# Patient Record
Sex: FEMALE | Race: WHITE | Employment: UNEMPLOYED | URBAN - METROPOLITAN AREA
[De-identification: names, ages, dates, MRNs, and addresses within clinical notes are randomized per-mention and may not be internally consistent; named-entity substitution may affect disease eponyms.]

---

## 2017-03-06 ENCOUNTER — HOSPITAL ENCOUNTER (OUTPATIENT)
Dept: PHYSICAL THERAPY | Age: 52
Discharge: HOME | End: 2017-03-06
Payer: MEDICARE

## 2017-03-06 PROCEDURE — G8981 BODY POS CURRENT STATUS: HCPCS

## 2017-03-06 PROCEDURE — G8982 BODY POS GOAL STATUS: HCPCS

## 2017-03-06 PROCEDURE — 97162 PT EVAL MOD COMPLEX 30 MIN: CPT

## 2017-03-06 PROCEDURE — 97110 THERAPEUTIC EXERCISES: CPT
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
## 2017-03-06 NOTE — PROGRESS NOTES
PHYSICAL THERAPY - DAILY TREATMENT NOTE    Patient Name: Feliberto Hernandez        Date: 3/6/2017  : 1965   YES Patient  Verified  Visit #:     Insurance: Payor: Елена Wu / Plan: [place] MED[place] & B / Product Type: Medicare /      In time: 1000 Out time: 1025   Total Treatment Time: 25     Medicare Time Tracking (below)   Total Timed Codes (min):  8 1:1 Treatment Time:  8     TREATMENT AREA =  Radiculopathy, cervical region [M54.12]  Low back pain [M54.5]    SUBJECTIVE  Pain Level (on 0 to 10 scale):  7  / 10   Medication Changes/New allergies or changes in medical history, any new surgeries or procedures? NO    If yes, update Summary List   Subjective Functional Status/Changes:  []  No changes reported     See POC          OBJECTIVE      8 min Therapeutic Exercise:  [x]  See flow sheet   Rationale:      increase ROM and increase strength to improve the patients ability to perform self care activities. min Patient Education:  YES  Reviewed HEP   []  Progressed/Changed HEP based on: Other Objective/Functional Measures:    See POC     Post Treatment Pain Level (on 0 to 10) scale:   7  / 10     ASSESSMENT  Assessment/Changes in Function:     See POC     []  See Progress Note/Recertification   Patient will continue to benefit from skilled PT services to modify and progress therapeutic interventions, address functional mobility deficits, address ROM deficits, address strength deficits, analyze and address soft tissue restrictions, analyze and cue movement patterns, analyze and modify body mechanics/ergonomics and assess and modify postural abnormalities to attain remaining goals.    Progress toward goals / Updated goals:    See POC     PLAN  [x]  Upgrade activities as tolerated YES Continue plan of care   []  Discharge due to :    []  Other:      Therapist: Tata Burgos PT    Date: 3/6/2017 Time: 10:47 AM     Future Appointments  Date Time Provider Shandra Vasquez 3/17/2017 1:30 PM Kwame Singh, PT [facility] [address]   3/21/2017 9:30 AM Otto Caballero, PT [place] [address]   3/24/2017 9:30 AM Kwame Singh, PT [facility] [place] [address]   3/28/2017 11:30 AM Otto Caballero, PT [facility] [place] [address]   3/31/2017 10:00 AM Otto Caballero, PT [facility] [place] [address]   4/3/2017 10:00 AM Kwame Singh, PT [facility] [place] [address]   4/7/2017 10:00 AM Kwame Singh, PT [facility] [place] [address]

## 2017-03-17 ENCOUNTER — HOSPITAL ENCOUNTER (OUTPATIENT)
Dept: PHYSICAL THERAPY | Age: 52
End: 2017-03-17
Payer: MEDICARE

## 2017-03-21 ENCOUNTER — APPOINTMENT (OUTPATIENT)
Dept: PHYSICAL THERAPY | Age: 52
End: 2017-03-21
Payer: MEDICARE

## 2017-03-23 ENCOUNTER — HOSPITAL ENCOUNTER (OUTPATIENT)
Dept: PHYSICAL THERAPY | Age: 52
Discharge: HOME | End: 2017-03-23
Payer: MEDICARE

## 2017-03-23 PROCEDURE — 97110 THERAPEUTIC EXERCISES: CPT

## 2017-03-23 PROCEDURE — 97140 MANUAL THERAPY 1/> REGIONS: CPT

## 2017-03-23 NOTE — PROGRESS NOTES
PHYSICAL THERAPY - DAILY TREATMENT NOTE    Patient Name: Lenka Sandhu        Date: 3/23/2017  : 1965   YES Patient  Verified  Visit #:   2   of   12  Insurance: Payor: Charo Fang / Plan: [place] MEDICARE PART A & B / Product Type: Medicare /      In time: 855 Out time: 937   Total Treatment Time: 42     Medicare Time Tracking (below)   Total Timed Codes (min):  42 1:1 Treatment Time:  32     TREATMENT AREA =  Low back pain [M54.5]  Neck pain [M54.2]    SUBJECTIVE  Pain Level (on 0 to 10 scale):  7  / 10   Medication Changes/New allergies or changes in medical history, any new surgeries or procedures? NO    If yes, update Summary List   Subjective Functional Status/Changes:  []  No changes reported     Patient reports she hasn't been able to attend PT since her IE because of having bronchitis and then a sinus infection. Patient does report compliance with her HEP and denies questions or complications. OBJECTIVE      32 min Therapeutic Exercise:  [x]  See flow sheet   Rationale:      increase ROM and increase strength to improve the patients ability to perform household activities. 10 min Manual Therapy: STM to B cervical paraspinals, B UT, B SOR; cervical spine joint mobs   Rationale:      decrease pain, increase ROM, increase tissue extensibility and decrease trigger points to improve patient's ability to perform self care activities. min Patient Education:  YES  Reviewed HEP   []  Progressed/Changed HEP based on: Other Objective/Functional Measures:    1:1 TE 25'  Patient presenting with significant tenderness to B cervical paraspinal and UT musculature during MT, limiting effectiveness. Progressed TE program for improved cervical spine ROM and scapular strength and stability. Post Treatment Pain Level (on 0 to 10) scale:   5  / 10     ASSESSMENT  Assessment/Changes in Function:     Patient reported improved symptoms post session.  Educated to utilize heat or ice when at home in order to reduce any resulting soreness from today's session. []  See Progress Note/Recertification   Patient will continue to benefit from skilled PT services to modify and progress therapeutic interventions, address functional mobility deficits, address ROM deficits, address strength deficits, analyze and address soft tissue restrictions, analyze and cue movement patterns, analyze and modify body mechanics/ergonomics and assess and modify postural abnormalities to attain remaining goals.    Progress toward goals / Updated goals:    Progressing with STG#1     PLAN  [x]  Upgrade activities as tolerated YES Continue plan of care   []  Discharge due to :    []  Other:      Therapist: Dara Pruitt, PT    Date: 3/23/2017 Time: 10:55 AM     Future Appointments  Date Time Provider Shandra Vasquez   3/24/2017 9:30 AM Evelyn Rios PT [facility]   3/28/2017 11:30 AM Dara Pruitt, PT [facility]   3/31/2017 10:00 AM Dara Pruitt PT [facility]   4/3/2017 10:00 AM Evelyn Rios PT [facility]   4/7/2017 10:00 AM Evelyn Rios, PT [facility]

## 2017-03-24 ENCOUNTER — HOSPITAL ENCOUNTER (OUTPATIENT)
Dept: PHYSICAL THERAPY | Age: 52
Discharge: HOME | End: 2017-03-24
Payer: MEDICARE

## 2017-03-24 PROCEDURE — 97140 MANUAL THERAPY 1/> REGIONS: CPT

## 2017-03-24 NOTE — PROGRESS NOTES
PHYSICAL THERAPY - DAILY TREATMENT NOTE    Patient Name: Chad Young        Date: 3/24/2017  : 1965   YES Patient  Verified  Visit #:   3   of   12  Insurance: Payor: Abiodun Perish / Plan: [place] MEDICARE PART A & B / Product Type: Medicare /      In time: 9:20 Out time: 10:05   Total Treatment Time: 45     Medicare Time Tracking (below)   Total Timed Codes (min):  45 1:1 Treatment Time:  10     TREATMENT AREA =  Low back pain [M54.5]  Neck pain [M54.2]    SUBJECTIVE  Pain Level (on 0 to 10 scale):  6   10   Medication Changes/New allergies or changes in medical history, any new surgeries or procedures? NO    If yes, update Summary List   Subjective Functional Status/Changes:  []  No changes reported     No new c/o          OBJECTIVE      35 min Therapeutic Exercise:  [x]  See flow sheet   Rationale:      increase ROM, increase strength and improve coordination to improve the patients ability to perform ADLs     10 min Manual Therapy: STM c/s para, SCM, UT, SOR   Rationale:      decrease pain, increase ROM and increase tissue extensibility to improve patient's ability to perform ADLs       min Patient Education:  YES  Reviewed HEP   []  Progressed/Changed HEP based on: Other Objective/Functional Measures:    1:1 9:55 - 10:05  Man Rx  C/o increaes in pain with chin tucks     Post Treatment Pain Level (on 0 to 10) scale:   5  / 10     ASSESSMENT  Assessment/Changes in Function:     Good swathi to all other ex without increase in pain      []  See Progress Note/Recertification   Patient will continue to benefit from skilled PT services to modify and progress therapeutic interventions, address functional mobility deficits, address ROM deficits, address strength deficits, analyze and address soft tissue restrictions, analyze and cue movement patterns, analyze and modify body mechanics/ergonomics and assess and modify postural abnormalities to attain remaining goals.    Progress toward goals / Updated goals:     No change towards LTGs     PLAN  []  Upgrade activities as tolerated YES Continue plan of care   []  Discharge due to :    []  Other:      Therapist: Chriss Fernandez, PT, OCS, SCS, CSCS    Date: 3/24/2017 Time: 10:04 AM       Future Appointments  Date Time Provider Shandra Vasquez   3/28/2017 11:30 AM [address] [facility]   3/31/2017 10:00 AM Max Angela, PT [facility]   4/3/2017 10:00 AM Lana Estrada PT [facility]   4/7/2017 10:00 AM Lana Estrada PT [facility]

## 2017-03-28 ENCOUNTER — APPOINTMENT (OUTPATIENT)
Dept: PHYSICAL THERAPY | Age: 52
End: 2017-03-28
Payer: MEDICARE

## 2017-03-31 ENCOUNTER — HOSPITAL ENCOUNTER (OUTPATIENT)
Dept: PHYSICAL THERAPY | Age: 52
Discharge: HOME | End: 2017-03-31
Payer: MEDICARE

## 2017-03-31 PROCEDURE — 97140 MANUAL THERAPY 1/> REGIONS: CPT

## 2017-03-31 PROCEDURE — G8982 BODY POS GOAL STATUS: HCPCS

## 2017-03-31 PROCEDURE — G8983 BODY POS D/C STATUS: HCPCS

## 2017-03-31 PROCEDURE — 97110 THERAPEUTIC EXERCISES: CPT

## 2017-03-31 NOTE — PROGRESS NOTES
PHYSICAL THERAPY - DAILY TREATMENT NOTE    Patient Name: Matti Tony        Date: 3/31/2017  : 1965   YES Patient  Verified  Visit #:     Insurance: Payor: Mirta Yeny / Plan: [place] MEDICARE PART A & B / Product Type: Medicare /      In time: 122 Out time: 1030   Total Treatment Time: 33     Medicare Time Tracking (below)   Total Timed Codes (min):  33 1:1 Treatment Time:  23     TREATMENT AREA =  Low back pain [M54.5]  Neck pain [M54.2]    SUBJECTIVE  Pain Level (on 0 to 10 scale):  0  / 10   Medication Changes/New allergies or changes in medical history, any new surgeries or procedures? NO    If yes, update Summary List   Subjective Functional Status/Changes:  []  No changes reported     Patient reports her neck is feeling better and she has been exercising it regularly on her own. Patient states she would like to be evaluated for her lower back soon as well. OBJECTIVE      23 min Therapeutic Exercise:  [x]  See flow sheet   Rationale:      increase ROM and increase strength to improve the patients ability to perform household activities. 10 min Manual Therapy: STM to L cervical paraspinals, L scalenes, L UT/LS; Cervical spine joint mobs, SOR, L UT stretch   Rationale:      decrease pain, increase ROM, increase tissue extensibility and decrease trigger points to improve patient's ability to perform carrying activities. min Patient Education:  YES  Reviewed HEP   []  Progressed/Changed HEP based on: Other Objective/Functional Measures:    1:1 TE 13'  Improving soft tissue extensibility noted to L cervical spine musculature compared to previous sessions  Demonstrates god knowledge of exercise program requiring minimal cuing for form/technique. Post Treatment Pain Level (on 0 to 10) scale:   5  / 10     ASSESSMENT  Assessment/Changes in Function:     Patient reported increased pain post session which she related to exercise and manual therapy.  Patient educated to continue with her HEP at this time. []  See Progress Note/Recertification   Patient will continue to benefit from skilled PT services to modify and progress therapeutic interventions, address functional mobility deficits, address ROM deficits, address strength deficits, analyze and address soft tissue restrictions, analyze and cue movement patterns, analyze and modify body mechanics/ergonomics and assess and modify postural abnormalities to attain remaining goals.    Progress toward goals / Updated goals:    Met STG#1 and progressing with STG#2     PLAN  [x]  Upgrade activities as tolerated YES Continue plan of care   []  Discharge due to :    []  Other:      Therapist: Maribeth Ceballos PT    Date: 3/31/2017 Time: 10:38 AM     Future Appointments  Date Time Provider Shandra Vasquez   4/3/2017 10:00 AM Jacque Thomas PT [facility]   4/7/2017 10:00 AM Jacque Thomas, PT [facility]   4/10/2017 11:00 AM Maribeth Ceballos PT [facility]

## 2017-04-03 ENCOUNTER — APPOINTMENT (OUTPATIENT)
Dept: PHYSICAL THERAPY | Age: 52
End: 2017-04-03
Payer: MEDICARE

## 2017-04-07 ENCOUNTER — HOSPITAL ENCOUNTER (OUTPATIENT)
Dept: PHYSICAL THERAPY | Age: 52
Discharge: HOME | End: 2017-04-07
Payer: MEDICARE

## 2017-04-10 ENCOUNTER — APPOINTMENT (OUTPATIENT)
Dept: PHYSICAL THERAPY | Age: 52
End: 2017-04-10
Payer: MEDICARE

## 2017-04-10 ENCOUNTER — HOSPITAL ENCOUNTER (OUTPATIENT)
Dept: PHYSICAL THERAPY | Age: 52
Discharge: HOME | End: 2017-04-10
Payer: MEDICARE

## 2017-04-10 PROCEDURE — 97110 THERAPEUTIC EXERCISES: CPT

## 2017-04-10 PROCEDURE — G8979 MOBILITY GOAL STATUS: HCPCS

## 2017-04-10 PROCEDURE — G8978 MOBILITY CURRENT STATUS: HCPCS

## 2017-04-10 PROCEDURE — G8980 MOBILITY D/C STATUS: HCPCS

## 2017-04-10 PROCEDURE — 97162 PT EVAL MOD COMPLEX 30 MIN: CPT

## 2017-04-10 NOTE — PROGRESS NOTES
PHYSICAL THERAPY - DAILY TREATMENT NOTE    Patient Name: Lexis Pickard        Date: 4/10/2017  : 1965   YES Patient  Verified  Visit #:     Insurance: Payor: Ileana Salazar / Plan: [place] MEDICARE PART A & B / Product Type: Medicare /      In time: 1000 Out time: 1030   Total Treatment Time: 30     Medicare Time Tracking (below)   Total Timed Codes (min):  10 1:1 Treatment Time:  10     TREATMENT AREA =  Low back pain [M54.5]    SUBJECTIVE  Pain Level (on 0 to 10 scale):  8  / 10   Medication Changes/New allergies or changes in medical history, any new surgeries or procedures? NO    If yes, update Summary List   Subjective Functional Status/Changes:  []  No changes reported     See POC          OBJECTIVE      10 min Therapeutic Exercise:  [x]  See flow sheet   Rationale:      increase ROM, increase strength, improve coordination and improve balance to improve the patients ability to perform walking activities. min Patient Education:  YES  Reviewed HEP   []  Progressed/Changed HEP based on: Other Objective/Functional Measures:    See POC     Post Treatment Pain Level (on 0 to 10) scale:    10     ASSESSMENT  Assessment/Changes in Function:     See POC     []  See Progress Note/Recertification   Patient will continue to benefit from skilled PT services to modify and progress therapeutic interventions, address functional mobility deficits, address ROM deficits, address strength deficits, analyze and address soft tissue restrictions, analyze and cue movement patterns, analyze and modify body mechanics/ergonomics, assess and modify postural abnormalities and address imbalance/dizziness to attain remaining goals.    Progress toward goals / Updated goals:    See POC     PLAN  [x]  Upgrade activities as tolerated YES Continue plan of care   []  Discharge due to :    []  Other:      Therapist: Roby Aburto, PT    Date: 4/10/2017 Time: 10:48 AM     Future Appointments  Date Time Provider Shandra Vasquez   4/17/2017 2:30 PM Erlin Collins, PT [facility]   4/21/2017 10:30 AM Kory Broderick, PT [facility]   4/24/2017 9:30 AM Kory Broderick, PT [facility]   4/28/2017 10:30 AM Erlin Collins, PT [facility]
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
## 2017-04-17 ENCOUNTER — APPOINTMENT (OUTPATIENT)
Dept: PHYSICAL THERAPY | Age: 52
End: 2017-04-17
Payer: MEDICARE

## 2017-04-21 ENCOUNTER — HOSPITAL ENCOUNTER (OUTPATIENT)
Dept: PHYSICAL THERAPY | Age: 52
Discharge: HOME | End: 2017-04-21
Payer: MEDICARE

## 2017-04-21 PROCEDURE — 97110 THERAPEUTIC EXERCISES: CPT

## 2017-04-21 PROCEDURE — 97140 MANUAL THERAPY 1/> REGIONS: CPT

## 2017-04-21 NOTE — PROGRESS NOTES
PHYSICAL THERAPY - DAILY TREATMENT NOTE    Patient Name: Claudean Myron        Date: 2017  : 1965   YES Patient  Verified  Visit #:     Insurance: Payor: Syeda Valladares / Plan: [place] MED[place] & B / Product Type: Medicare /      In time: 10:30 Out time: 11:10   Total Treatment Time: 40     Medicare Time Tracking (below)   Total Timed Codes (min):  40 1:1 Treatment Time:  40     TREATMENT AREA =  Low back pain [M54.5]    SUBJECTIVE  Pain Level (on 0 to 10 scale):  6  / 10   Medication Changes/New allergies or changes in medical history, any new surgeries or procedures? NO    If yes, update Summary List   Subjective Functional Status/Changes:  []  No changes reported     Felt better after initial eval.  Now I feel at my low back and pain down to my L leg          OBJECTIVE      30 min Therapeutic Exercise:  [x]  See flow sheet   Rationale:      increase ROM, increase strength and improve coordination to improve the patients ability to perform DALs     10 min Manual Therapy: DTM  Para, GM, piri, L5 L ERS, L on L SI jose   Rationale:      decrease pain, increase ROM and increase tissue extensibility to improve patient's ability to perform ADLs       min Patient Education:  YES  Reviewed HEP   []  Progressed/Changed HEP based on: Other Objective/Functional Measures:    Held some ex secondary to pain      Post Treatment Pain Level (on 0 to 10) scale:   6  / 10     ASSESSMENT  Assessment/Changes in Function:   Poor swathi to most ex     []  See Progress Note/Recertification   Patient will continue to benefit from skilled PT services to modify and progress therapeutic interventions, address functional mobility deficits, address ROM deficits, address strength deficits, analyze and address soft tissue restrictions, analyze and cue movement patterns, analyze and modify body mechanics/ergonomics and assess and modify postural abnormalities to attain remaining goals.    Progress toward goals / Updated goals:     Independent with all Ex     PLAN  []  Upgrade activities as tolerated YES Continue plan of care   []  Discharge due to :    []  Other:      Therapist: Radha Ramirez, PT, OCS, SCS, CSCS    Date: 4/21/2017 Time: 10:35 AM       Future Appointments  Date Time Provider Shandra Vasquez   4/24/2017 9:30 AM Jalyn Stevenson, PT [facility]   4/28/2017 10:30 AM Elizabeth Tirado, PT [facility]

## 2017-04-24 ENCOUNTER — HOSPITAL ENCOUNTER (OUTPATIENT)
Dept: PHYSICAL THERAPY | Age: 52
Discharge: HOME | End: 2017-04-24
Payer: MEDICARE

## 2017-04-24 PROCEDURE — G8979 MOBILITY GOAL STATUS: HCPCS

## 2017-04-24 PROCEDURE — 97140 MANUAL THERAPY 1/> REGIONS: CPT

## 2017-04-24 PROCEDURE — 97110 THERAPEUTIC EXERCISES: CPT

## 2017-04-24 PROCEDURE — G8980 MOBILITY D/C STATUS: HCPCS

## 2017-04-24 NOTE — PROGRESS NOTES
PHYSICAL THERAPY - DAILY TREATMENT NOTE    Patient Name: Barbara Madrigal        Date: 2017  : 1965   YES Patient  Verified  Visit #:   3   of   12  Insurance: Payor: Bassem Jain / Plan: [place] MEDICARE PART A & B / Product Type: Medicare /      In time: 9:20 Out time: 10:05   Total Treatment Time: 45     Medicare Time Tracking (below)   Total Timed Codes (min):  45 1:1 Treatment Time:  40     TREATMENT AREA =  Low back pain [M54.5]    SUBJECTIVE  Pain Level (on 0 to 10 scale):  6  / 10   Medication Changes/New allergies or changes in medical history, any new surgeries or procedures? NO    If yes, update Summary List   Subjective Functional Status/Changes:  []  No changes reported     No new c/o          OBJECTIVE  30 min Therapeutic Exercise: [x] See flow sheet   Rationale: increase ROM, increase strength and improve coordination to improve the patients ability to perform DALs      15 min Manual Therapy: DTM Para, GM, piri, L5 L ERS, L on L SI jose   Rationale: decrease pain, increase ROM and increase tissue extensibility to improve patient's ability to perform ADLs     min Patient Education:  YES  Reviewed HEP   []  Progressed/Changed HEP based on: Other Objective/Functional Measures:    1:1 9:20 - 9:35  Man Rx  1:1 9:35 - 10:00  therex  Difficulty activating TrA noted     Post Treatment Pain Level (on 0 to 10) scale:    10     ASSESSMENT  Assessment/Changes in Function:     Good swathi to all Rx without increase in pain      []  See Progress Note/Recertification   Patient will continue to benefit from skilled PT services to modify and progress therapeutic interventions, address functional mobility deficits, address ROM deficits, address strength deficits, analyze and address soft tissue restrictions, analyze and cue movement patterns, analyze and modify body mechanics/ergonomics and assess and modify postural abnormalities to attain remaining goals.    Progress toward goals / Updated goals:     No change towards LTGs today     PLAN  []  Upgrade activities as tolerated YES Continue plan of care   []  Discharge due to :    []  Other:      Therapist: Lucy Jones, PT, OCS, SCS, CSCS    Date: 4/24/2017 Time: 9:23 AM       Future Appointments  Date Time Provider Shandra Vasquez   4/24/2017 9:30 AM Bj Singletary PT [facility]   4/28/2017 10:30 AM Prakash Prescott PT [facility]   5/5/2017 9:30 AM Bj Singletary PT [facility]   5/8/2017 11:00 AM Prakash [place] PT [facility]

## 2017-04-28 ENCOUNTER — APPOINTMENT (OUTPATIENT)
Dept: PHYSICAL THERAPY | Age: 52
End: 2017-04-28
Payer: MEDICARE

## 2017-05-05 ENCOUNTER — APPOINTMENT (OUTPATIENT)
Dept: PHYSICAL THERAPY | Age: 52
End: 2017-05-05

## 2017-05-08 ENCOUNTER — APPOINTMENT (OUTPATIENT)
Dept: PHYSICAL THERAPY | Age: 52
End: 2017-05-08

## 2017-05-15 ENCOUNTER — APPOINTMENT (OUTPATIENT)
Dept: PHYSICAL THERAPY | Age: 52
End: 2017-05-15

## 2017-05-22 NOTE — PROGRESS NOTES
[address] PHYSICAL THERAPY  [address] [place] [address], [place] [address],[facility], 70 [facility] - Phone: [phone]  Fax: [phone] FOR PHYSICAL THERAPY          Patient Name: Rebeca Dimas : 1965   Treatment/Medical Diagnosis: Low back pain [M54.5]  Neck pain [M54.2]   Onset Date: 17    Referral Source: Amy Lopez MD [facility]): 3/6/17   Prior Hospitalization: See Medical History Provider #: [id number]   Prior Level of Function: No difficulty with driving, self care, sitting, walking or standing activities   Comorbidities: Fibromyalgia, Depression, Arthritis, Latex allergy, Tobacco use   Medications: Verified on Patient Summary List   Visits from [facility]: 4 Missed Visits: 3     Goal/Measure of Progress Goal Met? 1. Patient will improve FOTO to >/= 65/100 in order to improve quality of life   Status at last Eval: 47/100 Current Status: 46/100 no   2. Patient will report reduction in pain at worst to 2-3/10 in order to improve tolerance to self care activities. Status at last Eval: 10/10 Current Status: 0/10 yes   3. Patient will improve cervical spine AROM to WNL and pain free for all planes in order to improve tolerance to household activities. Status at last Eval: Cervical spine AROM: flex 18 (P!), ext 5 (P!), R rot 30 (P!), L rot 10 (P!), R SB 5 (P!), l SB 5 (P!) Current Status: Cervical Spine AROM WNL and pain free yes     Key Functional Changes/Progress: Patient arrived to clinic on 17 stating her neck felt good and was not experiencing any more pain. Patient demonstrated full cervical spine AROM without discomfort. Patient educated to continue with current therapy program independently. While patient reported a reduction in her FOTO score, she did report a +5 on the global rate of change scale indicating she was a good deal better. At this time, patient will be progressed toward DC to HEP.  Thank you for this referral. G-Codes (GP): [id number] Goal  CJ= 20-39%  [id number] D/C  CK= 40-59%. The severity rating is based on the FOTO Score  Assessments/Recommendations: Discontinue therapy. Progressing towards or have reached established goals. If you have any questions/comments please contact us directly at [phone]. Thank you for allowing us to assist in the care of your patient. Therapist Signature:  Bernetta Gowers, PT Date: 5/22/17   Reporting Period: 3/6/17-5/22/17 Time: 1:43 PM

## 2017-06-14 NOTE — PROGRESS NOTES
[address] PHYSICAL THERAPY  [address] [place] [address] 51, [facility] [address],[place], 70 [facility] - Phone: [phone]  Fax: [phone] FOR PHYSICAL THERAPY          Patient Name: Claude Bustard : 1965   Treatment/Medical Diagnosis: Low back pain [M54.5]   Onset Date: 17    Referral Source: Wallace Omalley MD [facility] [place]): 4/10/17   Prior Hospitalization: See Medical History Provider #: [id number]   Prior Level of Function: No difficulty with bending, lifting, walking or standing activities   Comorbidities: Cervical Spine Pain, Fibromyalgia, Depression, Arthritis, Latex allergy, Tobacco use   Medications: Verified on Patient Summary List   Visits from [facility]: 3 Missed Visits: 3     Goal/Measure of Progress Goal Met? 1. Decrease max pain to 2-3/10 to assist with walking tolerance   Status at last Eval: 8/10 Current Status: na n/a   2. Increase FOTO score to 65 % show functional improvement   Status at last Eval: 46 Current Status: na n/a   3. Will rate >/= +5 on Global Rating of Change and be prepared to DC to HEP. Status at last Eval: na Current Status: na n/a     Key Functional Changes/Progress: Pt had good tolerance to PT treatment. Pt reports she is looking into having a lumbar surgery and self discharged. G-Codes (GP): Mobility  [id number] Goal  CJ= 20-39%  D/C  CK= 40-59%. The severity rating is based on the FOTO Score  Assessments/Recommendations: Discontinue therapy due to lack of attendance or compliance. If you have any questions/comments please contact us directly at [phone]. Thank you for allowing us to assist in the care of your patient.     Therapist Signature: Edvin Simpson, PT, OCS, SCS, CSCS Date: 2017   Reporting Period: 4/10/17 - 17 Time: 6:48 AM

## 2018-06-08 ENCOUNTER — HOSPITAL ENCOUNTER (OUTPATIENT)
Dept: PHYSICAL THERAPY | Age: 53
Discharge: HOME | End: 2018-06-08
Payer: MEDICARE

## 2018-06-08 PROCEDURE — G8978 MOBILITY CURRENT STATUS: HCPCS

## 2018-06-08 PROCEDURE — 97110 THERAPEUTIC EXERCISES: CPT

## 2018-06-08 PROCEDURE — 97161 PT EVAL LOW COMPLEX 20 MIN: CPT

## 2018-06-08 PROCEDURE — G8979 MOBILITY GOAL STATUS: HCPCS

## 2018-06-08 NOTE — PROGRESS NOTES
PHYSICAL THERAPY - DAILY TREATMENT NOTE    Patient Name: Jeniffer Denise        Date: 2018  : 1965   yes Patient  Verified  Visit #:     Insurance: Payor: Jv Staff / Plan: [place] MEDICARE PART A & B / Product Type: Medicare /      In time: 9:00A Out time: 10:00A   Total Treatment Time: 61     Medicare/ BCBS Time Tracking (below)   Total Timed Codes (min):  60 1:1 Treatment Time:  60     TREATMENT AREA =  Right foot pain [M79.671]    SUBJECTIVE  Pain Level (on 0 to 10 scale):  0  / 10   Medication Changes/New allergies or changes in medical history, any new surgeries or procedures?    no  If yes, update Summary List   Subjective Functional Status/Changes:  []  No changes reported     See POC          OBJECTIVE  Modalities Rationale:    PD   min [] Estim, type/location:                                      []  att     []  unatt     []  w/US     []  w/ice    []  w/heat    min []  Mechanical Traction: type/lbs                   []  pro   []  sup   []  int   []  cont    []  before manual    []  after manual    min []  Ultrasound, settings/location:      min []  Iontophoresis w/ dexamethasone, location:                                               []  take home patch       []  in clinic    min []  Ice     []  Heat    location/position:     min []  Vasopneumatic Device, press/temp:     min []  Other:    [] Skin assessment post-treatment (if applicable):    []  intact    []  redness- no adverse reaction     []redness  adverse reaction:        10 min Therapeutic Exercise:  [x]  See flow sheet   Rationale:      increase ROM, increase strength, improve coordination, improve balance and increase proprioception to improve the patients ability to perform unlimited ADLs        min Patient Education:  yes  Reviewed HEP   []  Progressed/Changed HEP based on:        Other Objective/Functional Measures:    See POC     Post Treatment Pain Level (on 0 to 10) scale:   0  / 10 ASSESSMENT  Assessment/Changes in Function:     Patient reports improved ability to ambulate after gentle stretching exercises. []  See Progress Note/Recertification   Patient will continue to benefit from skilled PT services to modify and progress therapeutic interventions, address functional mobility deficits, address ROM deficits, address strength deficits, analyze and address soft tissue restrictions, analyze and cue movement patterns, analyze and modify body mechanics/ergonomics, assess and modify postural abnormalities, address imbalance/dizziness and instruct in home and community integration to attain remaining goals. Progress toward goals / Updated goals:    Progressing towards LTG 2. PLAN  [x]  Upgrade activities as tolerated yes Continue plan of care   []  Discharge due to :    []  Other:      Therapist: Ector Mendiola PT, DPT    Date: 6/8/2018 Time: 12:47 PM     No future appointments.

## 2018-06-08 NOTE — PROGRESS NOTES
[address] [facility] AT [address] [place] [address] [facility], [address] [place] [address], [address]  Phone: [phone]  Fax: [phone] / [address] [address] PHYSICAL THERAPY SERVICES  Patient Name: Diomedes Montoya : 1965   Medical   Diagnosis: Right foot pain [M79.671] Treatment Diagnosis: R foot pain   Onset Date: 18     Referral Source: Gavin Pascual DPM Start of Care [facility]): 2018   Prior Hospitalization: See medical history Provider #: [id number]   Prior Level of Function: Ambulating > 16,000 steps without pain   Comorbidities: H/o LBP, c/s pain, L knee pain, chronic bronchitis, arthritis    Medications: Verified on Patient Summary List   The Plan of Care and following information is based on the information from the initial evaluation.   ==================================================================================  Assessment / key information:  Pt is a 47 y.o. female who presents to [facility] with Dx of s/p bunionectomy and R bursitis. Patient reports initial onset of sx on 18 when she reports having a mass shaved form the plantar surface of the R foot as well as a bunionectomy performed. Patient reports sx are intermittent in nature. Walking, walking on incline/sand, and ambulating in tennis shoes increase sx, rest,, not walking and use of celebrex decrease symptoms. Average reported pain level at 2/10, 5/10 at worst & 0/10 at best.  She currently ambulates in hard sole sandals and is unable to wear sneakers for > 4 hours due to increases in overall pain/swelling. Objective evaluation findings are as follows: 1) patient ambulates with poor toe off on the RLE, dec L step length 2) poor scar mobility noted on the dorsal surface of R great toe 3) dec gastroc-soleus mm length on the R 4) strength of the R ankle; post tib 3+/5, eversion 4-/5, toe flexion 3+/5, DF and PF 4/5.   L ankle is 4/5 throughout. 5)  Great toe dorsiflexion on the L: 0-55 passively, R: 0-25 degrees with pain, ist met blocked L: 0-20 degrees, R: 0-5 degrees. A home exercise program was given to address above deficits. Patient can benefit from PT interventions to improve strength, range of motion, balance, proprioception, coordination, decrease pain, to facilitate ADLs & overall functional status.   ==================================================================================  Eval Complexity: History HIGH Complexity :3+ comorbidities / personal factors will impact the outcome/ POC ;  Examination  MEDIUM Complexity : 3 Standardized tests and measures addressing body structure, function, activity limitation and / or participation in recreation ; Presentation LOW Complexity : Stable, uncomplicated ;  Decision Making MEDIUM Complexity : FOTO score of 26-74; Overall Complexity LOW   Problem List: pain affecting function, decrease ROM, decrease strength, edema affecting function, impaired gait/ balance, decrease ADL/ functional abilitiies, decrease activity tolerance, decrease flexibility/ joint mobility and decrease transfer abilities   Treatment Plan may include any combination of the following: Therapeutic exercise, Therapeutic activities, Neuromuscular re-education, Physical agent/modality, Gait/balance training, Manual therapy, Aquatic therapy, Patient education, Self Care training, Functional mobility training, Home safety training and Stair training  Patient / Family readiness to learn indicated by: asking questions, trying to perform skills and interest  Persons(s) to be included in education: patient (P)  Barriers to Learning/Limitations: None  Measures taken:    Patient Goal (s): \"no pain movement\"   Patient self reported health status: fair  Rehabilitation Potential: good   Short Term Goals: To be accomplished in  2  weeks:  1) Establish HEP to prevent further disability.     2) Patient will report decreased c/o pain to < or = 3/10 to facilitate ambulation with manageable sx in R foot. 3) Patient will increase R great toe dorsiflexion by 5 degrees to facilitate ambulation on uneven surfaces. 4) Pt will increase FOTO score to >/= 58 in order to allow for ease with ADLs.  Long Term Goals: To be accomplished in  4  weeks:  1) Pt to be I and compliant with a progressive high level HEP in order to maintain gains made in physical therapy. 2) Pt will maintain SLS on compliant surface for >/= 10s with good dynamic stability in order to ambulate on uneven surfaces. 3) Patient will increase overall R ankle strength to 4/5 in order to perform unlimited ambulation. 4) Patient will improve FOTO score to >/= 63 in order to allow for ease with ADLs. Frequency / Duration:   Patient to be seen  2  times per week for 4  weeks:  Patient / Caregiver education and instruction: self care, activity modification, brace/ splint application and exercises  G-Codes (GP): Mobility [id number] Current  CK= 40-59%   Goal  CJ= 20-39%. The severity rating is based on the FOTO Score    Therapist Signature: Rayshawn Rain PT, DPT Date: 1/7/5011   Certification Period: 6/8/18- 9/8/18 Time: 9:09 AM   ===========================================================================================  I certify that the above Physical Therapy Services are being furnished while the patient is under my care. I agree with the treatment plan and certify that this therapy is necessary. Physician Signature:        Date:       Time:     Please sign and return to [facility] or you may fax the signed copy to [phone]. Thank you.

## 2018-07-05 NOTE — PROGRESS NOTES
[address]  [facility]  [facility], [address] ,[address], [address]  Phone: [phone]  Fax: [phone] FOR PHYSICAL THERAPY          Patient Name: Lino Avina : 1965   Treatment/Medical Diagnosis: Right foot pain [M79.671]   Onset Date: 18    Referral Source: Brandon Young DPM Start of Care [facility]): 18   Prior Hospitalization: See Medical History Provider #: [id number]   Prior Level of Function: Ambulating  > 29438 steps without pain   Comorbidities: H/o LBP, c/s pain, L knee pain, chronic bronchitis, arthritis   Medications: Verified on Patient Summary List   Visits from [facility]: 1 Missed Visits: 0       Key Functional Changes/Progress: Ms. Fadia Quinn was seen for her initial evaluation, after speaking with patient to schedule f/u appointment she reports breaking her 1st and 2nd toe and is in a CAM boot, she is unable to drive and cannot perform therapy at this point in time. Progress towards goals was unable to be made. G-Codes (GP): NA  Assessments/Recommendations: Discontinue therapy due to lack of attendance or compliance. If you have any questions/comments please contact us directly at [phone]. Thank you for allowing us to assist in the care of your patient.     Therapist Signature: Iven Lennox PT, DPT Date: 2018   Reporting Period: 18-18 Time: 2:16 PM